# Patient Record
Sex: FEMALE | Race: WHITE | NOT HISPANIC OR LATINO | Employment: OTHER | URBAN - METROPOLITAN AREA
[De-identification: names, ages, dates, MRNs, and addresses within clinical notes are randomized per-mention and may not be internally consistent; named-entity substitution may affect disease eponyms.]

---

## 2024-11-18 ENCOUNTER — TELEPHONE (OUTPATIENT)
Dept: ENDOCRINOLOGY | Facility: CLINIC | Age: 69
End: 2024-11-18
Payer: MEDICARE

## 2024-11-18 NOTE — TELEPHONE ENCOUNTER
Caller: Nia Lux    Relationship: Self    Best call back number: [phone]    What form or medical record are you requesting: NEW PATIENT PAPERWORK    Who is requesting this form or medical record from you: PT    How would you like to receive the form or medical records (pick-up, mail, fax): MAIL  If fax, what is the fax number:   If mail, what is the address: [address] [place] [address]     If pick-up, provide patient with address and location details    Timeframe paperwork needed:     Additional notes:

## 2024-12-03 ENCOUNTER — OFFICE VISIT (OUTPATIENT)
Dept: ENDOCRINOLOGY | Facility: CLINIC | Age: 69
End: 2024-12-03
Payer: MEDICARE

## 2024-12-03 VITALS
DIASTOLIC BLOOD PRESSURE: 90 MMHG | SYSTOLIC BLOOD PRESSURE: 148 MMHG | WEIGHT: 204 LBS | BODY MASS INDEX: 33.99 KG/M2 | HEIGHT: 65 IN | HEART RATE: 77 BPM | OXYGEN SATURATION: 98 %

## 2024-12-03 DIAGNOSIS — E66.9 OBESITY (BMI 30-39.9): ICD-10-CM

## 2024-12-03 DIAGNOSIS — Z78.0 POST-MENOPAUSAL: ICD-10-CM

## 2024-12-03 DIAGNOSIS — Z86.39 HISTORY OF THYROID DISEASE: ICD-10-CM

## 2024-12-03 DIAGNOSIS — I10 HYPERTENSION, UNSPECIFIED TYPE: Primary | ICD-10-CM

## 2024-12-03 PROCEDURE — G2211 COMPLEX E/M VISIT ADD ON: HCPCS | Performed by: INTERNAL MEDICINE

## 2024-12-03 PROCEDURE — 1159F MED LIST DOCD IN RCRD: CPT | Performed by: INTERNAL MEDICINE

## 2024-12-03 PROCEDURE — 1160F RVW MEDS BY RX/DR IN RCRD: CPT | Performed by: INTERNAL MEDICINE

## 2024-12-03 PROCEDURE — 99204 OFFICE O/P NEW MOD 45 MIN: CPT | Performed by: INTERNAL MEDICINE

## 2024-12-03 RX ORDER — ROSUVASTATIN CALCIUM 5 MG/1
5 TABLET, COATED ORAL EVERY OTHER DAY
COMMUNITY

## 2024-12-03 RX ORDER — CHLORDIAZEPOXIDE HYDROCHLORIDE AND CLIDINIUM BROMIDE 5; 2.5 MG/1; MG/1
1 CAPSULE ORAL EVERY 12 HOURS SCHEDULED
COMMUNITY
Start: 2024-10-11

## 2024-12-03 RX ORDER — NEBIVOLOL 20 MG/1
1 TABLET ORAL DAILY
COMMUNITY
Start: 2024-08-22

## 2024-12-03 RX ORDER — ZOLPIDEM TARTRATE 5 MG/1
5 TABLET ORAL
COMMUNITY
Start: 2024-08-14

## 2024-12-03 RX ORDER — AMLODIPINE BESYLATE 10 MG/1
1 TABLET ORAL DAILY
COMMUNITY
Start: 2024-10-10

## 2024-12-03 NOTE — PROGRESS NOTES
-----------------------------------------------------------------  ENDOCRINE CLINIC NOTE  -----------------------------------------------------------------        PATIENT NAME: Nia Lux  PATIENT : 1955 AGE: 69 y.o.  MRN NUMBER: [id number]  PRIMARY CARE: Renea Person MD    ==========================================================================    CHIEF COMPLAINT: Elevation for secondary hypertension  DATE OF SERVICE: 24    ==========================================================================    HPI / SUBJECTIVE    69 y.o. female is seen in the clinic today for evaluation for secondary hypertension.  Patient reports that she was diagnosed with hypertension in  at the age of 50.  Patient is currently taking 3 different medication including amlodipine, Bystolic and olmesartan therapy.  Patient is concerned that if she have secondary hypertension or not therefore was referred to endocrinology for evaluation.  Patient reports to be taking medication religiously and still have blood pressure on the higher side.  Patient is not currently on not been on diuretic therapy in the past.  Patient also reports to have a history of thyroid disorder in the past for which she was treated with medication and not currently on any levothyroxine therapy for a long time.  Denies any family history of premature cardiovascular disease.  Patient reports that she was recently started on statin therapy.  Patient is status post hysterectomy at the age of 42 and since then patient remains on hormone replacement therapy as well.    ==========================================================================                                                PAST MEDICAL HISTORY    Past Medical History:   Diagnosis Date    History of IBS     Hyperlipidemia     Hypertension 2005       ==========================================================================    PAST SURGICAL HISTORY    Past Surgical History:    Procedure Laterality Date    HYSTERECTOMY  1997    SINUS SURGERY  1993       ==========================================================================    FAMILY HISTORY    Family History   Problem Relation Age of Onset    Diabetes Father     Thyroid disease Father     Heart disease Father     Thyroid disease Daughter        ==========================================================================    SOCIAL HISTORY    Social History     Socioeconomic History    Marital status:    Tobacco Use    Smoking status: Former     Current packs/day: 0.25     Average packs/day: 0.3 packs/day for 2.0 years (0.5 ttl pk-yrs)     Types: Cigarettes    Smokeless tobacco: Never   Vaping Use    Vaping status: Never Used   Substance and Sexual Activity    Alcohol use: Yes    Drug use: Never    Sexual activity: Yes     Partners: Female     Birth control/protection: Vasectomy, Hysterectomy       ==========================================================================    MEDICATIONS      Current Outpatient Medications:     amLODIPine (NORVASC) 10 MG tablet, Take 1 tablet by mouth Daily., Disp: , Rfl:     clidinium-chlordiazePOXIDE (LIBRAX) 5-2.5 MG per capsule, Take 1 capsule by mouth Every 12 (Twelve) Hours., Disp: , Rfl:     ESTRADIOL PO, Take  by mouth., Disp: , Rfl:     nebivolol (BYSTOLIC) 20 MG tablet, Take 1 tablet by mouth Daily., Disp: , Rfl:     OLMESARTAN-AMLODIPINE-HCTZ PO, Take 40 mg by mouth Daily., Disp: , Rfl:     rosuvastatin (CRESTOR) 5 MG tablet, Take 1 tablet by mouth Every Other Day., Disp: , Rfl:     zolpidem (AMBIEN) 5 MG tablet, Take 1 tablet by mouth every night at bedtime., Disp: , Rfl:     ==========================================================================    ALLERGIES    No Known Allergies    ==========================================================================    OBJECTIVE    Vitals:    12/03/24 1346   BP: 148/90   Pulse: 77   SpO2: 98%     Body mass index is 33.95 kg/m².     General:  Alert, cooperative, no acute distress, no cervical dorsal fat pad  Thyroid:  no enlargement/tenderness/palpable nodules  Lungs: Clear to auscultation bilaterally, respirations unlabored  Heart: Regular rate and rhythm, S1 and S2 normal, no murmur, rub or gallop  Abdomen: Soft, NT, ND and Bowel sounds Positive, no abdominal striae  Extremities:  Extremities normal, atraumatic, no cyanosis or edema    ==========================================================================    ASSESSMENT AND PLAN    # Secondary hypertension evaluation  - Will order BMP along with aldosterone, renin, catecholamine, TSH and free T4 levels  - Given patient is maintained on oral contraceptive therapy/hormone replacement therapy dexamethasone suppression is not a good clinical marker therefore we will order 24-hour urine cortisol levels  - Will review the results and further plan accordingly  - Secondary hypertension evaluation of secondary hypertension were discussed with patient in detail to which she verbalized understanding    # Post menopausal state, currently on hormone replacement therapy as per, care as per PCP  # History of thyroid disorder, TSH and free T4 levels ordered  # Obesity with BMI of 33.95    Thank you for courtesy of consultation.    Return to clinic: As per blood work results    Entire assessment and plan was discussed and counseled the patient in detail to which patient verbalized understanding and agreed with care.  Answered all queries and concerns.    Part of this note may be an electronic transcription/translation of spoken language to printed text using the Dragon Dictation System.     Note: Portions of this note may have been copied from previous notes but documentation have been reviewed and edited as necessary to support clinical decision making for today's visit.    ==========================================================================    INFORMATION PROVIDED TO PATIENT    Patient Instructions    Please,    - Please get fasting blood work done.  - Also please get 24-hour urine evaluation.    Will review the results and there is any significant finding we will plan for further evaluation accordingly.    Thank you for your visit today.    If you have any questions or concerns please feel free to reach out of the office.       ==========================================================================  Alfredo Wade MD  Department of Endocrine, Diabetes and Metabolism  [place], [place]  ==========================================================================

## 2024-12-03 NOTE — PATIENT INSTRUCTIONS
Please,    - Please get fasting blood work done.  - Also please get 24-hour urine evaluation.    Will review the results and there is any significant finding we will plan for further evaluation accordingly.    Thank you for your visit today.    If you have any questions or concerns please feel free to reach out of the office.

## 2024-12-27 LAB
ALDOST SERPL-MCNC: 14.3 NG/DL (ref 0–30)
BUN SERPL-MCNC: 19 MG/DL (ref 8–27)
BUN/CREAT SERPL: 25 (ref 12–28)
CALCIUM SERPL-MCNC: 9.5 MG/DL (ref 8.7–10.3)
CHLORIDE SERPL-SCNC: 101 MMOL/L (ref 96–106)
CO2 SERPL-SCNC: 24 MMOL/L (ref 20–29)
CORTIS F 24H UR-MCNC: 9 UG/L
CORTIS F 24H UR-MRATE: 16 UG/24 HR (ref 6–42)
CREAT SERPL-MCNC: 0.76 MG/DL (ref 0.57–1)
CREATINE 24H UR-MRATE: 378 MG/24 HR (ref 0–80)
CREATINE UR-MCNC: 21 MG/DL
DOPAMINE SERPL-MCNC: <30 PG/ML (ref 0–48)
EGFRCR SERPLBLD CKD-EPI 2021: 86 ML/MIN/1.73
EPINEPH PLAS-MCNC: 17 PG/ML (ref 0–62)
GLUCOSE SERPL-MCNC: 94 MG/DL (ref 70–99)
NOREPINEPH PLAS-MCNC: 582 PG/ML (ref 0–874)
POTASSIUM SERPL-SCNC: 3.8 MMOL/L (ref 3.5–5.2)
RENIN PLAS-CCNC: <0.167 NG/ML/HR (ref 0.17–5.38)
SODIUM SERPL-SCNC: 140 MMOL/L (ref 134–144)
T4 FREE SERPL-MCNC: 1.32 NG/DL (ref 0.82–1.77)
TSH SERPL DL<=0.005 MIU/L-ACNC: 2.79 UIU/ML (ref 0.45–4.5)

## 2024-12-31 ENCOUNTER — TELEPHONE (OUTPATIENT)
Dept: ENDOCRINOLOGY | Facility: CLINIC | Age: 69
End: 2024-12-31

## 2024-12-31 NOTE — TELEPHONE ENCOUNTER
Caller: Nia Lux    Relationship: Self    Best call back number: [phone]    What is the best time to reach you: ANYTIME    Who are you requesting to speak with (clinical staff, provider,  specific staff member): CLINICAL STAFF / PROVIDER     What was the call regarding: PT CALLED STATING SHE RECEIVED HER LAB RESULTS BACK FROM [facility] BUT IS UNSURE ON HOW TO READ THEM . PLEASE ADVISE.

## 2025-01-16 ENCOUNTER — TELEPHONE (OUTPATIENT)
Dept: ENDOCRINOLOGY | Facility: CLINIC | Age: 70
End: 2025-01-16

## 2025-01-16 NOTE — TELEPHONE ENCOUNTER
Hub staff attempted to follow warm transfer process and was unsuccessful     Caller: Nia Lux    Relationship to patient: Self    Best call back number: [phone]    Patient is needing: HAD A TEST DONE IN NOVEMBER AND HAS NOT HEARD BACK ON RESULTS PLEASE CALL AND ADVISE

## 2025-01-17 NOTE — TELEPHONE ENCOUNTER
Called and care discussed with patient over the phone.  Will work on patient chart to get evaluated by saline infusion test for confirmatory testing for high aldosterone.

## 2025-02-26 DIAGNOSIS — E26.9 HYPERALDOSTERONISM, UNSPECIFIED: Primary | ICD-10-CM

## 2025-02-27 PROBLEM — E26.9 HYPERALDOSTERONISM, UNSPECIFIED: Status: ACTIVE | Noted: 2025-02-27

## 2025-02-27 RX ORDER — SODIUM CHLORIDE 9 MG/ML
500 INJECTION, SOLUTION INTRAVENOUS CONTINUOUS
Status: CANCELLED | OUTPATIENT
Start: 2025-03-03

## 2025-03-03 ENCOUNTER — HOSPITAL ENCOUNTER (OUTPATIENT)
Dept: INFUSION THERAPY | Facility: HOSPITAL | Age: 70
Discharge: HOME | End: 2025-03-03
Admitting: INTERNAL MEDICINE
Payer: MEDICARE

## 2025-03-03 VITALS
OXYGEN SATURATION: 100 % | DIASTOLIC BLOOD PRESSURE: 97 MMHG | SYSTOLIC BLOOD PRESSURE: 161 MMHG | HEART RATE: 71 BPM | TEMPERATURE: 98.6 F | RESPIRATION RATE: 20 BRPM

## 2025-03-03 DIAGNOSIS — I10 HYPERTENSION, UNSPECIFIED TYPE: ICD-10-CM

## 2025-03-03 DIAGNOSIS — Z86.39 HISTORY OF THYROID DISEASE: ICD-10-CM

## 2025-03-03 DIAGNOSIS — E26.9 HYPERALDOSTERONISM, UNSPECIFIED: Primary | ICD-10-CM

## 2025-03-03 PROCEDURE — 36592 COLLECT BLOOD FROM PICC: CPT

## 2025-03-03 PROCEDURE — 96360 HYDRATION IV INFUSION INIT: CPT

## 2025-03-03 PROCEDURE — 36415 COLL VENOUS BLD VENIPUNCTURE: CPT

## 2025-03-03 PROCEDURE — 96365 THER/PROPH/DIAG IV INF INIT: CPT

## 2025-03-03 PROCEDURE — 96366 THER/PROPH/DIAG IV INF ADDON: CPT

## 2025-03-03 PROCEDURE — 96361 HYDRATE IV INFUSION ADD-ON: CPT

## 2025-03-03 PROCEDURE — 25810000003 SODIUM CHLORIDE 0.9 % SOLUTION: Performed by: INTERNAL MEDICINE

## 2025-03-03 RX ORDER — SODIUM CHLORIDE 9 MG/ML
500 INJECTION, SOLUTION INTRAVENOUS CONTINUOUS
Status: CANCELLED | OUTPATIENT
Start: 2025-03-03

## 2025-03-03 RX ORDER — SODIUM CHLORIDE 9 MG/ML
500 INJECTION, SOLUTION INTRAVENOUS CONTINUOUS
Status: DISPENSED | OUTPATIENT
Start: 2025-03-03 | End: 2025-03-03

## 2025-03-03 RX ADMIN — SODIUM CHLORIDE 500 ML/HR: 9 INJECTION, SOLUTION INTRAVENOUS at 09:18

## 2025-03-10 ENCOUNTER — TELEPHONE (OUTPATIENT)
Dept: ENDOCRINOLOGY | Facility: CLINIC | Age: 70
End: 2025-03-10
Payer: MEDICARE

## 2025-03-10 NOTE — TELEPHONE ENCOUNTER
Caller: Nia Lux    Relationship: Self    Best call back number: [phone]    Caller requesting test results: PATIENT    What test was performed: INFUSION    When was the test performed: 3/3/25    Where was the test performed: [facility]     Additional notes: PT CALLING FOR HER INFUSION RESULTS

## 2025-03-14 ENCOUNTER — TELEPHONE (OUTPATIENT)
Dept: ENDOCRINOLOGY | Facility: CLINIC | Age: 70
End: 2025-03-14
Payer: MEDICARE

## 2025-03-14 NOTE — TELEPHONE ENCOUNTER
Ambulatory care left a vm stating they yuan the wrong saline infusion test and wanted to know if they needed to call the pt to get it repeated as the correct lab.Please advise.

## 2025-03-14 NOTE — TELEPHONE ENCOUNTER
Please update them that they have to call the patient back again to schedule saline infusion test.  Blood work that needs to be collected include:    Serum aldosterone level  Serum renin level  BMP    Procedure to collect saline infusion test is as follows:  To remain in seated position and start with normal saline 500 cc/h for total of 4 hours, that is patient received 2 L of fluid over 4 hours  At the completion of 4 hours blood draws include:  BMP -basic metabolic panel  Serum aldosterone level  Serum creatinine level    Thank you.

## 2025-03-17 NOTE — TELEPHONE ENCOUNTER
Faxed orders to ambulatory care and discussed the response in detail they are going to reach out to pt for reschedule

## 2025-03-18 NOTE — TELEPHONE ENCOUNTER
Ambulatory sent a secure chat stating that they are needing a signed order or at least an electronically signed order. Just to clarify, are all lab tests to be done after the saline infusion? The way the paper reads it looks like serum aldosterone level, serum renin level and BMP to be done before and then BMP. serum aldosterone, and serum creatinine to be done after the infusion is complete. If you can please fax over a new order to clarify everything would be great. Sorry we are having to bother you all again with this. Fax number is [phone].   Please advise

## 2025-03-20 DIAGNOSIS — E26.9 HYPERALDOSTERONISM: Primary | ICD-10-CM

## 2025-03-27 ENCOUNTER — TELEPHONE (OUTPATIENT)
Dept: ENDOCRINOLOGY | Facility: CLINIC | Age: 70
End: 2025-03-27
Payer: MEDICARE

## 2025-03-27 NOTE — TELEPHONE ENCOUNTER
Caller: Nia Lxu    Relationship to patient: Self    Best call back number: [phone]    Patient is needing: PATIENT CALLED BILLING AND THEY DID NOT KNOW ON PRICING EITHER FOR TEST, SO PATIENT IS NOT GOING TO DO IT

## 2025-04-22 ENCOUNTER — HOSPITAL ENCOUNTER (OUTPATIENT)
Dept: INFUSION THERAPY | Facility: HOSPITAL | Age: 70
Discharge: HOME | End: 2025-04-22
Payer: MEDICARE

## 2025-04-22 DIAGNOSIS — E26.9 HYPERALDOSTERONISM, UNSPECIFIED: Primary | ICD-10-CM

## 2025-04-29 ENCOUNTER — HOSPITAL ENCOUNTER (OUTPATIENT)
Dept: INFUSION THERAPY | Facility: HOSPITAL | Age: 70
Discharge: HOME | End: 2025-04-29
Admitting: INTERNAL MEDICINE
Payer: MEDICARE

## 2025-04-29 VITALS
HEART RATE: 66 BPM | DIASTOLIC BLOOD PRESSURE: 89 MMHG | RESPIRATION RATE: 16 BRPM | SYSTOLIC BLOOD PRESSURE: 142 MMHG | TEMPERATURE: 97.3 F | OXYGEN SATURATION: 99 %

## 2025-04-29 DIAGNOSIS — E26.9 HYPERALDOSTERONISM: ICD-10-CM

## 2025-04-29 DIAGNOSIS — E26.9 HYPERALDOSTERONISM, UNSPECIFIED: Primary | ICD-10-CM

## 2025-04-29 LAB
ANION GAP SERPL CALCULATED.3IONS-SCNC: 10 MMOL/L (ref 5–15)
ANION GAP SERPL CALCULATED.3IONS-SCNC: 9 MMOL/L (ref 5–15)
BUN SERPL-MCNC: 13 MG/DL (ref 8–23)
BUN SERPL-MCNC: 16 MG/DL (ref 8–23)
BUN/CREAT SERPL: 23.6 (ref 7–25)
BUN/CREAT SERPL: 24.2 (ref 7–25)
CALCIUM SPEC-SCNC: 8.3 MG/DL (ref 8.6–10.5)
CALCIUM SPEC-SCNC: 9 MG/DL (ref 8.6–10.5)
CHLORIDE SERPL-SCNC: 105 MMOL/L (ref 98–107)
CHLORIDE SERPL-SCNC: 106 MMOL/L (ref 98–107)
CO2 SERPL-SCNC: 24 MMOL/L (ref 22–29)
CO2 SERPL-SCNC: 27 MMOL/L (ref 22–29)
CREAT SERPL-MCNC: 0.55 MG/DL (ref 0.57–1)
CREAT SERPL-MCNC: 0.66 MG/DL (ref 0.57–1)
EGFRCR SERPLBLD CKD-EPI 2021: 100.6 ML/MIN/1.73
EGFRCR SERPLBLD CKD-EPI 2021: 96.3 ML/MIN/1.73
GLUCOSE SERPL-MCNC: 93 MG/DL (ref 65–99)
GLUCOSE SERPL-MCNC: 94 MG/DL (ref 65–99)
POTASSIUM SERPL-SCNC: 3.1 MMOL/L (ref 3.5–5.2)
POTASSIUM SERPL-SCNC: 3.1 MMOL/L (ref 3.5–5.2)
SODIUM SERPL-SCNC: 140 MMOL/L (ref 136–145)
SODIUM SERPL-SCNC: 141 MMOL/L (ref 136–145)

## 2025-04-29 PROCEDURE — 96360 HYDRATION IV INFUSION INIT: CPT

## 2025-04-29 PROCEDURE — 82088 ASSAY OF ALDOSTERONE: CPT | Performed by: INTERNAL MEDICINE

## 2025-04-29 PROCEDURE — 25810000003 SODIUM CHLORIDE 0.9 % SOLUTION: Performed by: INTERNAL MEDICINE

## 2025-04-29 PROCEDURE — 36415 COLL VENOUS BLD VENIPUNCTURE: CPT

## 2025-04-29 PROCEDURE — 84244 ASSAY OF RENIN: CPT | Performed by: INTERNAL MEDICINE

## 2025-04-29 PROCEDURE — 80048 BASIC METABOLIC PNL TOTAL CA: CPT | Performed by: INTERNAL MEDICINE

## 2025-04-29 PROCEDURE — 36592 COLLECT BLOOD FROM PICC: CPT

## 2025-04-29 PROCEDURE — 96361 HYDRATE IV INFUSION ADD-ON: CPT

## 2025-04-29 RX ORDER — SODIUM CHLORIDE 9 MG/ML
500 INJECTION, SOLUTION INTRAVENOUS CONTINUOUS
Status: CANCELLED | OUTPATIENT
Start: 2025-04-29

## 2025-04-29 RX ORDER — SODIUM CHLORIDE 9 MG/ML
500 INJECTION, SOLUTION INTRAVENOUS CONTINUOUS
Status: DISCONTINUED | OUTPATIENT
Start: 2025-04-29 | End: 2025-05-01 | Stop reason: HOSPADM

## 2025-04-29 RX ADMIN — SODIUM CHLORIDE 500 ML/HR: 9 INJECTION, SOLUTION INTRAVENOUS at 08:48

## 2025-05-05 ENCOUNTER — TELEPHONE (OUTPATIENT)
Dept: ENDOCRINOLOGY | Facility: CLINIC | Age: 70
End: 2025-05-05

## 2025-05-05 LAB
ALDOST SERPL-MCNC: 14 NG/DL (ref 0–30)
ALDOST/RENIN PLAS-RTO: >83.8 {RATIO} (ref 0–30)
RENIN PLAS-CCNC: <0.167 NG/ML/HR (ref 0.17–5.38)

## 2025-05-05 NOTE — TELEPHONE ENCOUNTER
Caller: Nia Lux    Relationship: Self    Best call back number:   Telephone Information:   Mobile [phone]     What was the call regarding: PT CALLED CHECKING ON STATUS OF TEST RESULTS FROM INFUSION DONE LAST TUESDAY. PLEASE ADVISE.

## 2025-05-07 NOTE — TELEPHONE ENCOUNTER
Caller: Nia Lux    Relationship: Self    Best call back number: [phone]    Caller requesting test results: PATIENT     What test was performed: LABS    When was the test performed: 04/29/25    Where was the test performed: [facility]    Additional notes: PATIENT STATES SHE REC'D A LETTER WITH THE RESULTS, BUT NEED CLARIFICATION ON WHAT IT ALL MEANS-PLEASE REACH OUT TO PATIENT

## 2025-05-08 ENCOUNTER — TELEPHONE (OUTPATIENT)
Dept: ENDOCRINOLOGY | Facility: CLINIC | Age: 70
End: 2025-05-08
Payer: MEDICARE

## 2025-05-08 DIAGNOSIS — E26.9 HYPERALDOSTERONISM: Primary | ICD-10-CM

## 2025-05-08 RX ORDER — AMLODIPINE BESYLATE 10 MG/1
5 TABLET ORAL DAILY
Start: 2025-05-08

## 2025-05-08 RX ORDER — SPIRONOLACTONE 25 MG/1
25 TABLET ORAL DAILY
Qty: 30 TABLET | Refills: 11 | Status: SHIPPED | OUTPATIENT
Start: 2025-05-08 | End: 2026-05-08

## 2025-05-08 NOTE — TELEPHONE ENCOUNTER
Care Update:    - Care discussed with patient and after saline infusion patient still have high aldosterone and low renin suggesting Primary Hyperaldosteronism  - Ordered CT Scan of the abdomen and requested adrenal protocol  - She is to have problem with amlodipine therapy causing significant lower extremity swelling, de-escalated amlodipine therapy to 5 mg and added spironolactone 25 mg, if needed would further escalate spironolactone and de-escalate amlodipine therapy if patient is having side effect  - Will review the CT abdomen but patient will still require surgical evaluation, if there is no significant identifiable nodule then patient will need AVS followed with surgical intervention  - Referral for surgery in place  - Will further escalate spironolactone as needed  - Repeat BMP and clinical follow-up in 6 weeks    Alfredo Wade MD  09:13 EDT  05/08/25

## 2025-05-08 NOTE — TELEPHONE ENCOUNTER
Hub staff attempted to follow warm transfer process and was unsuccessful     Caller: GIULIANA ZAZUETA    Relationship to patient: SELF    Best call back number: [phone]    Patient is needing: PATIENT IS WANTING TO SPEAK WITH TERESITA OR SOMEONE IN CLINICAL. SHE LOOKED UP THE MEDICATION THAT DR PRINCE WANTS TO PUT HER ON AND SHE NOTICED THAT IT IS A DIARRHEIC MEDICATION AND SHE DOES NOT DO GOOD ON THOSE. SHE WANTS TO TALK TO SOMEONE REGARDING THIS. PLEASE ADVISE

## 2025-05-09 NOTE — TELEPHONE ENCOUNTER
Called and care discussed with patient in detail.  Patient reports that she had previously tried diuretic therapy couple of years ago with primary care and she ended up having significant nausea and vomiting affecting quality of life.  Patient is not sure that kind of diuretic that was previously used.  Unlikely spironolactone was the first treatment of choice but patient will currently hold spironolactone therapy till she speaks with a primary care who is out of the office will be back on 5/12/2025.  If she have not tried spironolactone in the past then we will try spironolactone therapy and if she had previously tried spironolactone therapy then we will plan for alternative therapy.  The entire care was discussed with patient over the phone in detail, answered all question.  Patient at this time will hold spironolactone therapy even though it was ordered will continue amlodipine 10 mg p.o. daily.    Alfredo Wade MD  08:16 EDT  05/09/25

## 2025-05-16 ENCOUNTER — TELEPHONE (OUTPATIENT)
Dept: ENDOCRINOLOGY | Facility: CLINIC | Age: 70
End: 2025-05-16
Payer: MEDICARE

## 2025-05-16 NOTE — TELEPHONE ENCOUNTER
Care update:    - Care discussed with patient over the phone.  - Patient already spoke with her primary care and patient have not tried spironolactone therapy in the past.  - Patient is scheduled for CT scan on five 20/3 2025.  - Patient will start taking spironolactone therapy 5/19/2025 and plan for repeat blood work in early June 2025.  - Once patient will start taking spironolactone therapy will reduce the dose of amlodipine to 5 mg.  - Will review the results of the BMP and CT scan, further care accordingly.    Alfredo Wade MD  14:43 EDT  05/16/25

## 2025-05-23 ENCOUNTER — HOSPITAL ENCOUNTER (OUTPATIENT)
Dept: PET IMAGING | Facility: HOSPITAL | Age: 70
Discharge: HOME | End: 2025-05-23
Payer: MEDICARE

## 2025-05-23 DIAGNOSIS — E26.9 HYPERALDOSTERONISM: ICD-10-CM

## 2025-05-23 PROCEDURE — 25510000001 IOPAMIDOL PER 1 ML: Performed by: INTERNAL MEDICINE

## 2025-05-23 PROCEDURE — 74170 CT ABD WO CNTRST FLWD CNTRST: CPT

## 2025-05-23 RX ORDER — IOPAMIDOL 755 MG/ML
100 INJECTION, SOLUTION INTRAVASCULAR
Status: COMPLETED | OUTPATIENT
Start: 2025-05-23 | End: 2025-05-23

## 2025-05-23 RX ADMIN — IOPAMIDOL 100 ML: 755 INJECTION, SOLUTION INTRAVENOUS at 08:27

## 2025-06-17 ENCOUNTER — OFFICE VISIT (OUTPATIENT)
Dept: ENDOCRINOLOGY | Facility: CLINIC | Age: 70
End: 2025-06-17
Payer: MEDICARE

## 2025-06-17 VITALS
HEIGHT: 65 IN | DIASTOLIC BLOOD PRESSURE: 80 MMHG | WEIGHT: 206 LBS | OXYGEN SATURATION: 99 % | SYSTOLIC BLOOD PRESSURE: 130 MMHG | HEART RATE: 88 BPM | BODY MASS INDEX: 34.32 KG/M2

## 2025-06-17 DIAGNOSIS — I10 HYPERTENSION, UNSPECIFIED TYPE: ICD-10-CM

## 2025-06-17 DIAGNOSIS — E26.9 HYPERALDOSTERONISM: Primary | ICD-10-CM

## 2025-06-17 DIAGNOSIS — Z78.0 POST-MENOPAUSAL: ICD-10-CM

## 2025-06-17 DIAGNOSIS — E66.9 OBESITY (BMI 30-39.9): ICD-10-CM

## 2025-06-17 PROCEDURE — 99214 OFFICE O/P EST MOD 30 MIN: CPT | Performed by: INTERNAL MEDICINE

## 2025-06-17 PROCEDURE — 1160F RVW MEDS BY RX/DR IN RCRD: CPT | Performed by: INTERNAL MEDICINE

## 2025-06-17 PROCEDURE — 1159F MED LIST DOCD IN RCRD: CPT | Performed by: INTERNAL MEDICINE

## 2025-06-17 NOTE — PATIENT INSTRUCTIONS
Please,    - Continue to follow with surgical oncology for surgical evaluation of primary hyperaldosteronism.  - Continue the same dose of spironolactone therapy without any changes but since you are going to have a plan for adrenal venous sampling evaluation please stop spironolactone therapy and restart amlodipine 10 mg p.o. daily 4 weeks before the scheduled adrenal venous sampling procedure  - Wait for few days after completing the procedure to look for the lab results if they are appropriate then you can restart the spironolactone therapy back again and de-escalate amlodipine therapy    Plan for follow-up in endocrine clinic for now in 3 months time    Thank you for your visit today.    If you have any questions or concerns please feel free to reach out of the office.

## 2025-06-17 NOTE — PROGRESS NOTES
-----------------------------------------------------------------  ENDOCRINE CLINIC NOTE  -----------------------------------------------------------------        PATIENT NAME: Nia Lux  PATIENT : 1955 AGE: 69 y.o.  MRN NUMBER: [id number]  PRIMARY CARE: Renea Person MD    ==========================================================================    CHIEF COMPLAINT: Primary Hyperaldosteronism   DATE OF SERVICE: 25    ==========================================================================    HPI / SUBJECTIVE    69 y.o. female is seen in the clinic today for follow-up of primary hyperaldosteronism.  Patient diagnosed with hypertension in  at the age of 50.  Patient was taking T3 peptide of medication including amlodipine, Bystolic and olmesartan therapy.  There was concern of secondary hypertension and patient was referred to endocrinology clinic.  Patient blood work showed evidence of possible primary hyperparathyroidism on screening and patient underwent definitive evaluation/diagnostic evaluation which confirmed primary hyperparathyroidism with solid fusion testing.  Patient started on spironolactone therapy and de-escalated amlodipine therapy.  Patient since her last visit  have also seen surgical oncology.  Radiological workup was also done and CT in May 2025 showed left adrenal mass.  Denies any family history of premature cardiovascular disease.  Patient is also on statin therapy.  Patient is status post hysterectomy at the age of 42 and since then patient remains on hormone replacement therapy as well.    ==========================================================================                                                PAST MEDICAL HISTORY    Past Medical History:   Diagnosis Date    History of IBS     Hyperlipidemia     Hypertension 2005       ==========================================================================    PAST SURGICAL HISTORY    Past Surgical History:    Procedure Laterality Date    HYSTERECTOMY  1997    SINUS SURGERY  1993       ==========================================================================    FAMILY HISTORY    Family History   Problem Relation Age of Onset    Diabetes Father     Thyroid disease Father     Heart disease Father     Thyroid disease Daughter        ==========================================================================    SOCIAL HISTORY    Social History     Socioeconomic History    Marital status:    Tobacco Use    Smoking status: Former     Current packs/day: 0.25     Average packs/day: 0.3 packs/day for 2.0 years (0.5 ttl pk-yrs)     Types: Cigarettes    Smokeless tobacco: Never   Vaping Use    Vaping status: Never Used   Substance and Sexual Activity    Alcohol use: Yes    Drug use: Never    Sexual activity: Yes     Partners: Female     Birth control/protection: Vasectomy, Hysterectomy       ==========================================================================    MEDICATIONS      Current Outpatient Medications:     amLODIPine (NORVASC) 10 MG tablet, Take 0.5 tablets by mouth Daily., Disp: , Rfl:     clidinium-chlordiazePOXIDE (LIBRAX) 5-2.5 MG per capsule, Take 1 capsule by mouth Every 12 (Twelve) Hours., Disp: , Rfl:     ESTRADIOL PO, Take  by mouth., Disp: , Rfl:     nebivolol (BYSTOLIC) 20 MG tablet, Take 1 tablet by mouth Daily., Disp: , Rfl:     OLMESARTAN-AMLODIPINE-HCTZ PO, Take 40 mg by mouth Daily., Disp: , Rfl:     rosuvastatin (CRESTOR) 5 MG tablet, Take 1 tablet by mouth Every Other Day., Disp: , Rfl:     spironolactone (Aldactone) 25 MG tablet, Take 1 tablet by mouth Daily., Disp: 30 tablet, Rfl: 11    zolpidem (AMBIEN) 5 MG tablet, Take 1 tablet by mouth every night at bedtime., Disp: , Rfl:     ==========================================================================    ALLERGIES    No Known Allergies    ==========================================================================    OBJECTIVE    Vitals:     06/17/25 1119   BP: 130/80   Pulse: 88   SpO2: 99%     Body mass index is 34.28 kg/m².     General: Alert, cooperative, no acute distress, no cervical dorsal fat pad  Thyroid:  no enlargement/tenderness/palpable nodules  Lungs: Clear to auscultation bilaterally, respirations unlabored  Heart: Regular rate and rhythm, S1 and S2 normal, no murmur, rub or gallop  Abdomen: Soft, NT, ND and Bowel sounds Positive, no abdominal striae  Extremities:  Extremities normal, atraumatic, no cyanosis or edema    ==========================================================================  Component      Latest Ref Rng 12/17/2024 4/29/2025   Norepinephrine      0 - 874 pg/mL 582     Epinephrine      0 - 62 pg/mL 17     Dopamine      0 - 48 pg/mL <30     Aldosterone      0.0 - 30.0 ng/dL 14.3  14.0    Renin Activity      0.167 - 5.380 ng/mL/hr <0.167 (L)  <0.167 (L)    Aldosterone/Renin Ratio      0.0 - 30.0   >83.8 (H)    Creatine, U, mg/dL      Undefined mg/dL 21.0     Creatine, 24H Ur      0 - 80 mg/24 hr 378 (H)     Cortisol, Free      Undefined ug/L 9     Cortisol, 24H Ur      6 - 42 ug/24 hr 16        Legend:  (L) Low  (H) High    Blood work on 4/29/2025 was part of Saline Infusion Testing confirming the Primary Hyperaldosteronism.    ==========================================================================    CT ABDOMEN W WO CONTRAST     Date of Exam: 5/23/2025 8:09 AM EDT     Indication: Adrenal nodule evaluation for Primary Hyperaldosteronism.     Comparison: None available.     Technique: Axial CT images were obtained of the abdomen before and after the uneventful intravenous administration of iodinated contrast. Sagittal and coronal reconstructions were performed.  Automated exposure control and iterative reconstruction   methods were used.        Findings:  LUNG BASES: Linear scarring or atelectasis in the lung bases. Small to moderate hiatal hernia.     LIVER:  Unremarkable parenchyma without focal  lesion.  BILIARY/GALLBLADDER:  Unremarkable  SPLEEN:  Unremarkable  PANCREAS:  Unremarkable  ADRENAL: The right adrenal gland is unremarkable. There is a 4.1 x 3.0 cm heterogeneous left adrenal mass which contains areas of fat density as well as punctate calcifications.  KIDNEYS:  Unremarkable parenchyma with no solid mass identified. No obstruction.  There is a 3 mm stone in the proximal right ureter with minimal hydroureteronephrosis. There are punctate nonobstructing stones in the left kidney.  GASTROINTESTINAL/MESENTERY:  No evidence of obstruction nor inflammation.    MESENTERIC VESSELS:  Patent.  AORTA/IVC:  Normal caliber.     RETROPERITONEUM/LYMPH NODES:  Unremarkable        OSSEUS STRUCTURES:  Typical for age with no acute process identified. There is multilevel lumbar degenerative disc disease and facet joint arthropathy.     There is a small fat-containing umbilical hernia.     IMPRESSION:  Impression:     1. Heterogeneous 4 cm left adrenal mass with fat and calcification suggestive of adrenal myolipoma.  2. Punctate nonobstructive left nephrolithiasis and 3 mm right proximal ureteral calculus with minimal hydroureteronephrosis.           Electronically Signed: Wendy Baldwin MD    5/29/2025 11:49 AM EDT    Workstation ID: [id number]    ==========================================================================    ASSESSMENT AND PLAN    # Secondary hypertension evaluation  # Primary hyperaldosteronism  - Reviewed input from surgical oncology/endocrine surgeon from 6/10/2025  - Patient to undergo AVS at [facility] for lateralization of primary hyperaldosteronism  - Will follow recommendations from surgical oncology  - Patient to continue on spironolactone therapy for now but 4 weeks prior to scheduled AVS patient to stop spironolactone therapy and restart amlodipine 10 mg p.o. daily  - Patient to wait for the results for AVS to make sure this was a successful study before restarting  spironolactone once patient get confirmation from the surgical team that this was a successful study patient can restart spironolactone and decrease amlodipine back to 5 mg p.o. daily    # Post menopausal state, currently on hormone replacement therapy as per, care as per PCP  # History of thyroid disorder, TSH and free T4 levels ordered  # Obesity with BMI of 34.28    Thank you for courtesy of consultation.    Return to clinic: As per blood work results    Entire assessment and plan was discussed and counseled the patient in detail to which patient verbalized understanding and agreed with care.  Answered all queries and concerns.    Part of this note may be an electronic transcription/translation of spoken language to printed text using the Dragon Dictation System.     Note: Portions of this note may have been copied from previous notes but documentation have been reviewed and edited as necessary to support clinical decision making for today's visit.    ==========================================================================    INFORMATION PROVIDED TO PATIENT    Patient Instructions   Please,    - Continue to follow with surgical oncology for surgical evaluation of primary hyperaldosteronism.  - Continue the same dose of spironolactone therapy without any changes but since you are going to have a plan for adrenal venous sampling evaluation please stop spironolactone therapy and restart amlodipine 10 mg p.o. daily 4 weeks before the scheduled adrenal venous sampling procedure  - Wait for few days after completing the procedure to look for the lab results if they are appropriate then you can restart the spironolactone therapy back again and de-escalate amlodipine therapy    Plan for follow-up in endocrine clinic for now in 3 months time    Thank you for your visit today.    If you have any questions or concerns please feel free to reach out of the office.        ==========================================================================  Alfredo Wade MD  Department of Endocrine, Diabetes and Metabolism  [facility]  [place], [place]  ==========================================================================

## 2025-08-14 ENCOUNTER — TRANSCRIBE ORDERS (OUTPATIENT)
Dept: ADMINISTRATIVE | Facility: HOSPITAL | Age: 70
End: 2025-08-14
Payer: MEDICARE

## 2025-08-14 DIAGNOSIS — E78.5 DYSLIPIDEMIA: Primary | ICD-10-CM

## 2025-08-14 DIAGNOSIS — I10 ESSENTIAL HYPERTENSION, BENIGN: ICD-10-CM

## 2025-08-14 DIAGNOSIS — E26.9 HYPERALDOSTERONISM: ICD-10-CM

## 2025-08-28 ENCOUNTER — HOSPITAL ENCOUNTER (OUTPATIENT)
Dept: CARDIOLOGY | Facility: HOSPITAL | Age: 70
Discharge: HOME | End: 2025-08-28
Admitting: INTERNAL MEDICINE
Payer: MEDICARE

## 2025-08-28 VITALS — SYSTOLIC BLOOD PRESSURE: 132 MMHG | DIASTOLIC BLOOD PRESSURE: 80 MMHG

## 2025-08-28 DIAGNOSIS — E26.9 HYPERALDOSTERONISM: ICD-10-CM

## 2025-08-28 DIAGNOSIS — I10 ESSENTIAL HYPERTENSION, BENIGN: ICD-10-CM

## 2025-08-28 DIAGNOSIS — E78.5 DYSLIPIDEMIA: ICD-10-CM

## 2025-08-28 LAB
AORTIC DIMENSIONLESS INDEX: 0.88 (DI)
AV MEAN PRESS GRAD SYS DOP V1V2: 4 MMHG
AV VMAX SYS DOP: 141 CM/SEC
BH CV ECHO LEFT VENTRICLE GLOBAL LONGITUDINAL STRAIN: -18.4 %
BH CV ECHO MEAS - ACS: 1.6 CM
BH CV ECHO MEAS - AO MAX PG: 8 MMHG
BH CV ECHO MEAS - AO V2 VTI: 29 CM
BH CV ECHO MEAS - AVA(I,D): 3 CM2
BH CV ECHO MEAS - EDV(CUBED): 85.2 ML
BH CV ECHO MEAS - EDV(MOD-SP4): 96.3 ML
BH CV ECHO MEAS - EF(MOD-SP4): 61.4 %
BH CV ECHO MEAS - ESV(CUBED): 27 ML
BH CV ECHO MEAS - ESV(MOD-SP4): 37.2 ML
BH CV ECHO MEAS - FS: 31.8 %
BH CV ECHO MEAS - IVS/LVPW: 1.27 CM
BH CV ECHO MEAS - IVSD: 1.4 CM
BH CV ECHO MEAS - LA DIMENSION: 4.1 CM
BH CV ECHO MEAS - LAT PEAK E' VEL: 8.3 CM/SEC
BH CV ECHO MEAS - LV DIASTOLIC VOL/BSA (35-75): 48.1 CM2
BH CV ECHO MEAS - LV MASS(C)D: 203 GRAMS
BH CV ECHO MEAS - LV MAX PG: 6.3 MMHG
BH CV ECHO MEAS - LV MEAN PG: 3 MMHG
BH CV ECHO MEAS - LV SYSTOLIC VOL/BSA (12-30): 18.6 CM2
BH CV ECHO MEAS - LV V1 MAX: 125 CM/SEC
BH CV ECHO MEAS - LV V1 VTI: 25.5 CM
BH CV ECHO MEAS - LVIDD: 4.4 CM
BH CV ECHO MEAS - LVIDS: 3 CM
BH CV ECHO MEAS - LVOT AREA: 3.5 CM2
BH CV ECHO MEAS - LVOT DIAM: 2.1 CM
BH CV ECHO MEAS - LVPWD: 1.1 CM
BH CV ECHO MEAS - MED PEAK E' VEL: 5.6 CM/SEC
BH CV ECHO MEAS - MV A MAX VEL: 100 CM/SEC
BH CV ECHO MEAS - MV DEC SLOPE: 496 CM/SEC2
BH CV ECHO MEAS - MV DEC TIME: 0.22 SEC
BH CV ECHO MEAS - MV E MAX VEL: 74.9 CM/SEC
BH CV ECHO MEAS - MV E/A: 0.75
BH CV ECHO MEAS - MV MAX PG: 4.4 MMHG
BH CV ECHO MEAS - MV MEAN PG: 2 MMHG
BH CV ECHO MEAS - MV P1/2T: 55.6 MSEC
BH CV ECHO MEAS - MV V2 VTI: 27.4 CM
BH CV ECHO MEAS - MVA(P1/2T): 4 CM2
BH CV ECHO MEAS - MVA(VTI): 3.2 CM2
BH CV ECHO MEAS - PA ACC TIME: 0.15 SEC
BH CV ECHO MEAS - PA V2 MAX: 114 CM/SEC
BH CV ECHO MEAS - PULM A REVS DUR: 0.09 SEC
BH CV ECHO MEAS - PULM A REVS VEL: 32.4 CM/SEC
BH CV ECHO MEAS - PULM DIAS VEL: 43.3 CM/SEC
BH CV ECHO MEAS - PULM S/D: 1.45
BH CV ECHO MEAS - PULM SYS VEL: 62.9 CM/SEC
BH CV ECHO MEAS - RV MAX PG: 1.47 MMHG
BH CV ECHO MEAS - RV V1 MAX: 60.7 CM/SEC
BH CV ECHO MEAS - RV V1 VTI: 15.2 CM
BH CV ECHO MEAS - RVDD: 2.8 CM
BH CV ECHO MEAS - SV(LVOT): 88.3 ML
BH CV ECHO MEAS - SV(MOD-SP4): 59.1 ML
BH CV ECHO MEAS - SVI(LVOT): 44.1 ML/M2
BH CV ECHO MEAS - SVI(MOD-SP4): 29.5 ML/M2
BH CV ECHO MEAS - TAPSE (>1.6): 2.8 CM
BH CV ECHO MEASUREMENTS AVERAGE E/E' RATIO: 10.78
BH CV XLRA - TDI S': 13.4 CM/SEC
LV EF 3D SEGMENTATION: 63 %
LV EF BIPLANE MOD: 61 %
SINUS: 2.9 CM
STJ: 2.6 CM

## 2025-08-28 PROCEDURE — 93356 MYOCRD STRAIN IMG SPCKL TRCK: CPT

## 2025-08-28 PROCEDURE — 93306 TTE W/DOPPLER COMPLETE: CPT
